# Patient Record
Sex: MALE | Race: WHITE | NOT HISPANIC OR LATINO | Employment: FULL TIME | URBAN - METROPOLITAN AREA
[De-identification: names, ages, dates, MRNs, and addresses within clinical notes are randomized per-mention and may not be internally consistent; named-entity substitution may affect disease eponyms.]

---

## 2018-08-30 ENCOUNTER — OFFICE VISIT - HEALTHEAST (OUTPATIENT)
Dept: INTERNAL MEDICINE | Facility: CLINIC | Age: 38
End: 2018-08-30

## 2018-08-30 DIAGNOSIS — N52.9 ERECTILE DYSFUNCTION, UNSPECIFIED ERECTILE DYSFUNCTION TYPE: ICD-10-CM

## 2018-08-30 DIAGNOSIS — Z13.1 SCREENING FOR DIABETES MELLITUS: ICD-10-CM

## 2018-08-30 DIAGNOSIS — Z13.220 LIPID SCREENING: ICD-10-CM

## 2018-08-30 DIAGNOSIS — Z00.00 ROUTINE MEDICAL EXAM: ICD-10-CM

## 2018-08-30 LAB
ANION GAP SERPL CALCULATED.3IONS-SCNC: 8 MMOL/L (ref 5–18)
BUN SERPL-MCNC: 15 MG/DL (ref 8–22)
CALCIUM SERPL-MCNC: 9.2 MG/DL (ref 8.5–10.5)
CHLORIDE BLD-SCNC: 109 MMOL/L (ref 98–107)
CHOLEST SERPL-MCNC: 200 MG/DL
CO2 SERPL-SCNC: 23 MMOL/L (ref 22–31)
CREAT SERPL-MCNC: 0.95 MG/DL (ref 0.7–1.3)
FASTING STATUS PATIENT QL REPORTED: YES
GFR SERPL CREATININE-BSD FRML MDRD: >60 ML/MIN/1.73M2
GLUCOSE BLD-MCNC: 105 MG/DL (ref 70–125)
HDLC SERPL-MCNC: 31 MG/DL
LDLC SERPL CALC-MCNC: 120 MG/DL
POTASSIUM BLD-SCNC: 4.1 MMOL/L (ref 3.5–5)
SODIUM SERPL-SCNC: 140 MMOL/L (ref 136–145)
TRIGL SERPL-MCNC: 245 MG/DL

## 2018-08-30 ASSESSMENT — MIFFLIN-ST. JEOR: SCORE: 2081.53

## 2018-08-31 ENCOUNTER — COMMUNICATION - HEALTHEAST (OUTPATIENT)
Dept: INTERNAL MEDICINE | Facility: CLINIC | Age: 38
End: 2018-08-31

## 2019-05-08 ENCOUNTER — OFFICE VISIT - HEALTHEAST (OUTPATIENT)
Dept: FAMILY MEDICINE | Facility: CLINIC | Age: 39
End: 2019-05-08

## 2019-05-08 DIAGNOSIS — B02.9 HERPES ZOSTER WITHOUT COMPLICATION: ICD-10-CM

## 2019-05-08 RX ORDER — DIAPER,BRIEF,INFANT-TODD,DISP
EACH MISCELLANEOUS 2 TIMES DAILY
Status: SHIPPED | COMMUNITY
Start: 2019-05-08 | End: 2022-09-13

## 2019-08-21 ENCOUNTER — COMMUNICATION - HEALTHEAST (OUTPATIENT)
Dept: SCHEDULING | Facility: CLINIC | Age: 39
End: 2019-08-21

## 2019-08-21 DIAGNOSIS — N52.9 ERECTILE DYSFUNCTION, UNSPECIFIED ERECTILE DYSFUNCTION TYPE: ICD-10-CM

## 2019-09-13 ENCOUNTER — OFFICE VISIT - HEALTHEAST (OUTPATIENT)
Dept: FAMILY MEDICINE | Facility: CLINIC | Age: 39
End: 2019-09-13

## 2019-09-13 DIAGNOSIS — N52.9 ERECTILE DYSFUNCTION, UNSPECIFIED ERECTILE DYSFUNCTION TYPE: ICD-10-CM

## 2019-09-13 DIAGNOSIS — Z00.00 ROUTINE MEDICAL EXAM: ICD-10-CM

## 2019-09-13 DIAGNOSIS — Z13.220 SCREENING, LIPID: ICD-10-CM

## 2019-09-13 DIAGNOSIS — Z13.1 SCREENING FOR DIABETES MELLITUS: ICD-10-CM

## 2019-09-13 LAB
ANION GAP SERPL CALCULATED.3IONS-SCNC: 10 MMOL/L (ref 5–18)
BUN SERPL-MCNC: 18 MG/DL (ref 8–22)
CALCIUM SERPL-MCNC: 9.6 MG/DL (ref 8.5–10.5)
CHLORIDE BLD-SCNC: 107 MMOL/L (ref 98–107)
CHOLEST SERPL-MCNC: 216 MG/DL
CO2 SERPL-SCNC: 23 MMOL/L (ref 22–31)
CREAT SERPL-MCNC: 0.99 MG/DL (ref 0.7–1.3)
FASTING STATUS PATIENT QL REPORTED: YES
GFR SERPL CREATININE-BSD FRML MDRD: >60 ML/MIN/1.73M2
GLUCOSE BLD-MCNC: 105 MG/DL (ref 70–125)
HDLC SERPL-MCNC: 36 MG/DL
LDLC SERPL CALC-MCNC: 143 MG/DL
POTASSIUM BLD-SCNC: 4.3 MMOL/L (ref 3.5–5)
SODIUM SERPL-SCNC: 140 MMOL/L (ref 136–145)
TRIGL SERPL-MCNC: 183 MG/DL

## 2019-09-13 RX ORDER — SILDENAFIL CITRATE 20 MG/1
TABLET ORAL
Qty: 90 TABLET | Refills: 1 | Status: SHIPPED | OUTPATIENT
Start: 2019-09-13 | End: 2022-08-11

## 2019-09-13 ASSESSMENT — MIFFLIN-ST. JEOR: SCORE: 2102.96

## 2019-09-16 ENCOUNTER — COMMUNICATION - HEALTHEAST (OUTPATIENT)
Dept: FAMILY MEDICINE | Facility: CLINIC | Age: 39
End: 2019-09-16

## 2021-05-28 NOTE — PROGRESS NOTES
Assessment/Plan:         Abimael was seen today for rash.    Diagnoses and all orders for this visit:    Herpes zoster without complication: Classic appearance of shingles.  He is developing active new lesions so we will go ahead and treat with Valtrex.  Also given information about self cares, recommend ibuprofen and over-the-counter lidocaine cream.  Discussed concerning symptoms for which she should return.  Discussed transmission mechanisms particularly for immunocompromised-he has no significant contact with anyone at risk.  -     valACYclovir (VALTREX) 1000 MG tablet; Take 1 tablet (1,000 mg total) by mouth 3 (three) times a day for 7 days.            Plan of care was discussed with the patient and/or guardian. They verbalize understanding of the treatment options and plan of care.    Lilliana Ang       Subjective:        Abimael Lugo is a 39 y.o. male who presents for rash on his sie and chest.  Initially saw some lesions on his left chest 4 days ago. He had been out cutting down trees the days before so he thought maybe they were poison ivy or poison oak. Not painful or itchy so he ignored them. Over the last few days, more sores have appeared and they are more red. It is now starting to have a burning sensation and even the touch of his shirt hurts. It has spread around to the front of his chest in several small patches.     He has never had this before. Did have chickenpox as a child. He is fully immunized. Nobody at home has sores.    He otherwise feels well.  No fever, chills, rhinorrhea, cough.         Objective:       Blood pressure (!) 142/100, pulse 60, temperature 98.5  F (36.9  C), temperature source Oral, resp. rate 16, weight (!) 267 lb 1 oz (121.1 kg), SpO2 97 %.   Gen: Well-appearing, no acute distress.  Skin: Clear, fluid-filled papules on erythematous base in a dermatomal distribution on the left side of his chest around the level of T5.  Close to his back, the lesions are crusted.   As it wraps around to the front of his chest, there are newer looking fluid-filled papules with no crusting.

## 2021-05-31 NOTE — TELEPHONE ENCOUNTER
Refill Request  Did you contact pharmacy: No  Medication name:   Requested Prescriptions     Pending Prescriptions Disp Refills     sildenafil (REVATIO) 20 mg tablet 30 tablet 1     Sig: Take 2-4 tablets as directed, as needed     Who prescribed the medication: Dr. Enrike Bautista  Pharmacy Name and Location: [place], [place] ([facility] )  Is patient out of medication: Yes  Patient notified refills processed in 72 hours:  yes  Okay to leave a detailed message: yes  [phone]

## 2021-05-31 NOTE — TELEPHONE ENCOUNTER
RN cannot approve Refill Request    Former patient of [facility] has not established care with another provider.  Please assign refill request to covering provider per Clinic standard process.    RN can NOT refill this medication PCP messaged that patient is overdue for Office Visit. Last office visit: Visit date not found Last Physical: Visit date not found Last MTM visit: Visit date not found Last visit same specialty: Visit date not found.  Next visit within 3 mo: Visit date not found  Next physical within 3 mo: Visit date not found      Chasidy Salmeron, [facility] Triage/Med Refill 8/21/2019    Requested Prescriptions   Pending Prescriptions Disp Refills     sildenafil (REVATIO) 20 mg tablet 30 tablet 1     Sig: Take 2-4 tablets as directed, as needed       Medications for Impotence Refill Protocol Failed - 8/21/2019  9:09 AM        Failed - PCP or prescribing provider visit in last year     Last office visit with prescriber/PCP: Visit date not found OR same dept: Visit date not found OR same specialty: Visit date not found  Last physical: Visit date not found Last MTM visit: Visit date not found   Next visit within 3 mo: Visit date not found  Next physical within 3 mo: Visit date not found  Prescriber OR PCP: No Primary Care Provider  Last diagnosis associated with med order: There are no diagnoses linked to this encounter.  If protocol passes may refill for 12 months if within 3 months of last provider visit (or a total of 15 months).

## 2021-05-31 NOTE — TELEPHONE ENCOUNTER
I see no record the patient is established with the internal medicine clinic.  He has seen Dr. Enrike Bautista, and can follow-up with Dr. Enrike Bautista.  Refill request could be sent to Dr. Enrike Bautista.

## 2021-05-31 NOTE — TELEPHONE ENCOUNTER
Left message for pt. To call clinic please give message below:    Pt needs to set up an established care visit with one of our providers for continued care medication's.

## 2021-06-02 VITALS — WEIGHT: 258 LBS | HEIGHT: 70 IN | BODY MASS INDEX: 36.94 KG/M2

## 2021-06-03 VITALS
HEIGHT: 70 IN | SYSTOLIC BLOOD PRESSURE: 126 MMHG | HEART RATE: 62 BPM | WEIGHT: 263.6 LBS | BODY MASS INDEX: 37.74 KG/M2 | DIASTOLIC BLOOD PRESSURE: 84 MMHG | OXYGEN SATURATION: 98 %

## 2021-06-03 VITALS — BODY MASS INDEX: 38.32 KG/M2 | WEIGHT: 267.06 LBS

## 2021-06-16 PROBLEM — N52.9 ERECTILE DYSFUNCTION, UNSPECIFIED ERECTILE DYSFUNCTION TYPE: Status: ACTIVE | Noted: 2018-08-30

## 2021-06-17 NOTE — PATIENT INSTRUCTIONS - HE
Patient Instructions by Lilliana Ang MD at 5/8/2019  3:20 PM     Author: Lilliana Ang MD Service: -- Author Type: Physician    Filed: 5/8/2019  3:45 PM Encounter Date: 5/8/2019 Status: Signed    : Lilliana Ang MD (Physician)         Patient Education     LIDOCAINE (Over-the counter pain cream).   Shingles  Shingles is a viral infection caused by the same virus as chicken pox. Anyone who has had chicken pox may get shingles later in life. The virus stays in the body, but remains dormant (asleep). Shingles often occurs in older persons or persons with lowered immunity. But it can affect anyone at any age.  Shingles starts as a tingling patch of skin on one side of the body. Small, painful blisters may then appear. The rash does not spread to the rest of the body.  Exposure to shingles cannot cause shingles. However, it can cause chicken pox in anyone who has not had chicken pox or has not been vaccinated. The contagious period ends when all blisters have crusted over (generally about 2 weeks after the illness begins).  After the blisters heal, the affected skin may be sensitive or painful for months (neuralgia). This often gradually goes away.  A shingles vaccine is available. This can help prevent shingles or make it less painful. It is generally recommended for adults over the age of 60 who have had chicken pox in the past, but who have never had shingles. Adults over 60 who have had neither chicken pox nor shingles can prevent both diseases with the chicken pox vaccine. Ask your healthcare provider about these vaccines.  Home care    Medicines may be prescribed to help relieve pain. Take these medicines as directed. Ask your healthcare provider or pharmacist before using over-the-counter medicines for helping treat pain and itching.    In certain cases, antiviral medicines may be prescribed to reduce pain, shorten the illness, and prevent neuralgia. Take these medicines as  directed.    Compresses made from a solution of cool water mixed with cornstarch or baking soda may help relieve pain and itching.     Gently wash skin daily with soap and water to help prevent infection.  Be certain to rinse off all of the soap, which can be irritating.    Trim fingernails and try not to scratch. Scratching the sores may leave scars.    Stay home from work or school until all blisters have formed a crust and you are no longer contagious.  Follow-up care  Follow up with your healthcare provider or as directed by our staff.  When to seek medical advice    Fever of 100.4 F (38 C) or higher, or as directed by your healthcare provider    Affected skin is on the face or neck and any of the following occur:  ? Headache  ? Eye pain  ? Changes in vision  ? Sores near the eye  ? Weakness of facial muscles    Pain, redness, or swelling of a joint    Signs of skin infection: colored drainage from the sores, warmth, increasing redness, or increasing pain  Date Last Reviewed: 9/25/2015 2000-2017 [employer]. [address] [place] [address], [place] [address]. All rights reserved. This information is not intended as a substitute for professional medical care. Always follow your healthcare professional's instructions.

## 2021-06-19 NOTE — LETTER
Letter by Enrike Bautista MD at      Author: Enrike Bautista MD Service: -- Author Type: --    Filed:  Encounter Date: 9/16/2019 Status: (Other)         Abimael Lugo  [address] [facility] [address]   September 16, 2019     Dear Mr. Lugo,    Below are the results from your recent visit:  Resulted Orders   Basic Metabolic Panel   Result Value Ref Range    Sodium 140 136 - 145 mmol/L    Potassium 4.3 3.5 - 5.0 mmol/L    Chloride 107 98 - 107 mmol/L    CO2 23 22 - 31 mmol/L    Anion Gap, Calculation 10 5 - 18 mmol/L    Glucose 105 70 - 125 mg/dL    Calcium 9.6 8.5 - 10.5 mg/dL    BUN 18 8 - 22 mg/dL    Creatinine 0.99 0.70 - 1.30 mg/dL    GFR MDRD Af Amer >60 >60 mL/min/1.73m2    GFR MDRD Non Af Amer >60 >60 mL/min/1.73m2    Narrative    Fasting Glucose reference range is 70-99 mg/dL per  American Diabetes Association (ADA) guidelines.   Lipid Profile   Result Value Ref Range    Triglycerides 183 (H) <=149 mg/dL    Cholesterol 216 (H) <=199 mg/dL    LDL Calculated 143 (H) <=129 mg/dL    HDL Cholesterol 36 (L) >=40 mg/dL    Patient Fasting > 8hrs? Yes    Your kidney function tests are normal, and your blood sugar level is stable from last year, still what I would call low borderline.   Cholesterol is a bit higher than last time, with the LDL high at 143.     For both of these, I'd just work on your diet and exercise, and let's recheck in a year.     Please call with questions or contact us using [employer].    Sincerely,    Electronically signed by Enrike Bautista MD

## 2021-06-20 NOTE — PROGRESS NOTES
MALE PREVENTATIVE EXAM    Assessment and Plan:       1. Routine medical exam    We discussed healthy lifestyles and adequate diet today and also discussed increasing his exercise and losing weight.  The patient knows that he does need to lose weight more healthy.  I also emphasized this, given the fact that he has a pretty strong family history of hypertension, and he will need to work on his exercise long-term and his diet to keep that blood pressure down the best he can.  I suggested that he continue with trying to lose some weight on his own and recheck in about 6 months to see how things are coming along.    2. Erectile dysfunction, unspecified erectile dysfunction type    We discussed the fact that if he does lose some weight and exercises more, the erectile dysfunction might improve on its own, but he was given a prescription for some sildenafil that he can use as directed for some erectile dysfunction in the meantime he will let me know how it goes.    3. Lipid screening    - Lipid Profile    4. Screening for diabetes mellitus    - Basic Metabolic Panel        Next follow up:  No Follow-up on file.    Immunization Review  Adult Imm Review: No immunizations due today      I discussed the following with the patient:   Adult Healthy Living: Importance of regular exercise  Healthy nutrition  Weight loss referral options  Getting adequate sleep  Stress management  Use of seat belts      Subjective:   Chief Complaint: Abimael Lugo is an 38 y.o. male here for a preventative health visit.     HPI: Patient is here for a full physical today.  He states that he really has not been to a doctor for about 15 years.  He generally has been in good health.  He has had some minor sports medicine related issues over the years.  He played hockey in high school, and he has had a knee procedure done and he had a wrist fracture as well when he was in high school.    He admits that he is overweight, and is trying to lose weight  "now on his own.  He realized about a month ago that he probably needed to really buckled down and do this.  This was been trying to decrease his portion size and increase his exercise commitment.    He also discusses some issues ongoing now erectile dysfunction.  He has noticed this more over the last couple of months.  He states that he can often get an erection good enough for initial penetration but cannot go to completion of intercourse.  He has no problem with masturbation.  He has not had this issue in the past.  He has good libido and thinks it is just a bit of a performance anxiety issue at this point.    Healthy Habits  Are you taking a daily aspirin? No  Do you typically exercising at least 40 min, 3-4 times per week?  NO  Do you usually eat at least 4 servings of fruit and vegetables a day, include whole grains and fiber and avoid regularly eating high fat foods? NO  Have you had an eye exam in the past two years? NO  Do you see a dentist twice per year? Yes  Do you have any concerns regarding sleep? No    Safety Screen  If you own firearms, are they secured in a locked gun cabinet or with trigger locks? The patient declines to answer  Do you feel you are safe where you are living?: Yes (8/30/2018  7:24 AM)  Do you feel you are safe in your relationship(s)?: Yes (8/30/2018  7:24 AM)    Review of Systems:  Please see above.  The rest of the review of systems are negative for all systems.     Cancer Screening     Patient has no health maintenance due at this time            History     Reviewed By Date/Time Sections Reviewed    Enrike Bautista MD 8/30/2018  7:36 AM Medical, Surgical, Tobacco, Alcohol, Drug Use, Sexual Activity, Family    Elizabeth Lugo CMA 8/30/2018  7:24 AM Tobacco, Family    Elizabeth Lugo CMA 8/30/2018  7:23 AM Family            Objective:   Vital Signs:   Visit Vitals     /88     Pulse (!) 56     Ht 5' 10\" (1.778 m)     Wt (!) 258 lb (117 kg)     BMI 37.02 kg/m2    "       PHYSICAL EXAM  Well developed, well nourished, no acute distress.  HEENT: normocephalic/atraumatic, PERRLA/EOMI, TMs: Gray, normal light reflex, no nasal discharge.  Oral mucosa: no erythema/exudate  Neck: No LAD/masses/thyromegaly/bruits  Lungs: clear bilaterally  Heart: regular rate and rhythm, no murmurs/gallops/rubs.  Abdomen: Normal bowel sounds, soft, non-tender, non-distended, no masses, neg Solorzano's/McBurney's, no rebound/guarding  Genital: Bilaterally descended testes, no masses, non-tender, no hernia.  No urethral discharge or erythema.  No lesions, normal phallus.  Lymphatics: no supraclavicular/axillary/epitrochlear/inguinal LAD. No edema.  Neuro: A&O x 3, CN II-XII intact, strength 5/5, reflexes symmetric, sensory intact to light touch.  Psych: Behavior appropriate, engaging.  Thought processes congruent, non-tangential.  Musculoskeletal: no gross deformities.  Skin: no rashes or lesions.            Medication List          These changes are accurate as of 8/30/18  8:20 AM.  If you have any questions, ask your nurse or doctor.               START taking these medications          sildenafil (antihypertensive) 20 mg tablet   Also known as:  REVATIO   INSTRUCTIONS:  Take 2-4 tablets as directed, as needed   Started by:  Enrike Bautista MD                Where to Get Your Medications      You can get these medications from any pharmacy     Bring a paper prescription for each of these medications      sildenafil (antihypertensive) 20 mg tablet             Additional Screenings Completed Today:

## 2021-06-28 NOTE — PROGRESS NOTES
Progress Notes by Enrike Bautista MD at 9/13/2019  7:40 AM     Author: Enrike Bautista MD Service: -- Author Type: Physician    Filed: 9/13/2019  3:27 PM Encounter Date: 9/13/2019 Status: Signed    : Enrike Bautista MD (Physician)       MALE PREVENTATIVE EXAM    Assessment and Plan:       1. Routine medical exam    Generally the patient is doing very well.  We discussed healthy lifestyles, including adequate exercise (3-4 times a week for 20-30 minutes), and a healthy diet.  Patient should return for annual physicals, and we can also see them here as needed.       2. Erectile dysfunction, unspecified erectile dysfunction type    This would quite well for him, so would like to have a refill and I did print one out for him that he can take to the pharmacy.  If he needs another good Rx card he will let us know.    - sildenafil (REVATIO) 20 mg tablet; Take 2-4 tablets as directed, as needed  Dispense: 90 tablet; Refill: 1    3. Screening for diabetes mellitus    - Basic Metabolic Panel    4. Screening, lipid    - Lipid Profile        Next follow up:  No follow-ups on file.    Immunization Review  Adult Imm Review: No immunizations due today        I discussed the following with the patient:   Adult Healthy Living: Importance of regular exercise  Healthy nutrition  Weight loss referral options  Getting adequate sleep  Stress management    I have had an Advance Directives discussion with the patient.    Subjective:   Chief Complaint: Abimael Lugo is an 39 y.o. male here for a preventative health visit.     HPI: He is here for a physical today he is generally doing very well.  He needs some blood blood work done, and he is fasting so we will do that for him today.  His borderline glucose was reviewed from last year so we will do that again as well as his cholesterol.    He does have the erectile dysfunction we talked about last year and we gave him a prescription for some sildenafil which is worked well for him he  "would like to have a refill done for that today.    Otherwise he is doing quite well without any other issues or questions today.    Healthy Habits  Are you taking a daily aspirin? No  Do you typically exercising at least 40 min, 3-4 times per week?  Yes  Do you usually eat at least 4 servings of fruit and vegetables a day, include whole grains and fiber and avoid regularly eating high fat foods? Yes  Have you had an eye exam in the past two years? NO  Do you see a dentist twice per year? NO  Do you have any concerns regarding sleep? No    Safety Screen  If you own firearms, are they secured in a locked gun cabinet or with trigger locks? Yes  Do you feel you are safe where you are living?: Yes (5/8/2019  3:21 PM)  Do you feel you are safe in your relationship(s)?: Yes (5/8/2019  3:21 PM)      Review of Systems:  Please see above.  The rest of the review of systems are negative for all systems.     Cancer Screening     Patient has no health maintenance due at this time          Patient Care Team:  Enrike Bautista MD as PCP - General (Family Medicine)  Enrike Bautista MD as Assigned PCP        History     Reviewed By Date/Time Sections Reviewed    Vanesa Cantu CMA 9/13/2019  8:23 AM Tobacco            Objective:   Vital Signs:   Visit Vitals  /84 (Patient Site: Left Arm, Patient Position: Sitting, Cuff Size: Adult Large)   Pulse 62   Ht 5' 9.75\" (1.772 m)   Wt (!) 263 lb 9.6 oz (119.6 kg)   SpO2 98%   BMI 38.09 kg/m           PHYSICAL EXAM    Well developed, well nourished, no acute distress.  HEENT: normocephalic/atraumatic, PERRLA/EOMI, TMs: Gray, normal light reflex, no nasal discharge.  Oral mucosa: no erythema/exudate  Neck: No LAD/masses/thyromegaly/bruits  Lungs: clear bilaterally  Heart: regular rate and rhythm, no murmurs/gallops/rubs.  Abdomen: Normal bowel sounds, soft, non-tender, non-distended, no masses, neg Solorzano's/McBurney's, no rebound/guarding  Genital: Bilaterally descended testes, no " masses, non-tender, no hernia.  No urethral discharge or erythema.  No lesions, normal phallus.  Lymphatics: no supraclavicular/axillary/epitrochlear/inguinal LAD. No edema.  Neuro: A&O x 3, CN II-XII intact, strength 5/5, reflexes symmetric, sensory intact to light touch.  Psych: Behavior appropriate, engaging.  Thought processes congruent, non-tangential.  Musculoskeletal: no gross deformities.  Skin: no rashes or lesions.            Medication List           Accurate as of 9/13/19  3:27 PM. If you have any questions, ask your nurse or doctor.               CONTINUE taking these medications    hydrocortisone 0.5 % cream  INSTRUCTIONS:  Apply topically 2 (two) times a day.        sildenafil 20 mg tablet  Also known as:  REVATIO  INSTRUCTIONS:  Take 2-4 tablets as directed, as needed           STOP taking these medications    calamine lotion  Stopped by:  Enrike Bautista MD           Where to Get Your Medications      You can get these medications from any pharmacy    Bring a paper prescription for each of these medications    sildenafil 20 mg tablet         Additional Screenings Completed Today:

## 2021-07-03 NOTE — ADDENDUM NOTE
Addendum Note by Samara Gaston LPN at 8/22/2019  2:10 PM     Author: Samara Gaston LPN Service: -- Author Type: Licensed Nurse    Filed: 8/22/2019  2:10 PM Encounter Date: 8/21/2019 Status: Signed    : Samara Gaston LPN (Licensed Nurse)    Addended by: SAMARA GASTON on: 8/22/2019 02:10 PM        Modules accepted: Orders

## 2022-04-20 NOTE — TELEPHONE ENCOUNTER
Patient Returning Call  Reason for call:  Returning call  Information relayed to patient:    Relayed information to patient.  Patient has additional questions:  No  If YES, what are your questions/concerns:    No additional questions at this time.  Okay to leave a detailed message?: No call back needed   Skin Substitute Text: The defect edges were debeveled with a #15 scalpel blade.  Given the location of the defect, shape of the defect and the proximity to free margins a skin substitute graft was deemed most appropriate.  The graft material was trimmed to fit the size of the defect. The graft was then placed in the primary defect and oriented appropriately.

## 2022-08-11 ENCOUNTER — OFFICE VISIT (OUTPATIENT)
Dept: FAMILY MEDICINE | Facility: CLINIC | Age: 42
End: 2022-08-11
Payer: COMMERCIAL

## 2022-08-11 VITALS
HEIGHT: 71 IN | BODY MASS INDEX: 39.2 KG/M2 | DIASTOLIC BLOOD PRESSURE: 110 MMHG | SYSTOLIC BLOOD PRESSURE: 160 MMHG | WEIGHT: 280 LBS | OXYGEN SATURATION: 98 % | HEART RATE: 78 BPM

## 2022-08-11 DIAGNOSIS — R73.03 PREDIABETES: ICD-10-CM

## 2022-08-11 DIAGNOSIS — E66.01 CLASS 2 SEVERE OBESITY WITH SERIOUS COMORBIDITY AND BODY MASS INDEX (BMI) OF 39.0 TO 39.9 IN ADULT, UNSPECIFIED OBESITY TYPE (H): ICD-10-CM

## 2022-08-11 DIAGNOSIS — Z00.00 ROUTINE ADULT HEALTH MAINTENANCE: Primary | ICD-10-CM

## 2022-08-11 DIAGNOSIS — I10 PRIMARY HYPERTENSION: ICD-10-CM

## 2022-08-11 DIAGNOSIS — N52.9 ERECTILE DYSFUNCTION, UNSPECIFIED ERECTILE DYSFUNCTION TYPE: ICD-10-CM

## 2022-08-11 DIAGNOSIS — E66.812 CLASS 2 SEVERE OBESITY WITH SERIOUS COMORBIDITY AND BODY MASS INDEX (BMI) OF 39.0 TO 39.9 IN ADULT, UNSPECIFIED OBESITY TYPE (H): ICD-10-CM

## 2022-08-11 LAB
ALBUMIN SERPL BCG-MCNC: 4.7 G/DL (ref 3.5–5.2)
ALP SERPL-CCNC: 52 U/L (ref 40–129)
ALT SERPL W P-5'-P-CCNC: 70 U/L (ref 10–50)
ANION GAP SERPL CALCULATED.3IONS-SCNC: 16 MMOL/L (ref 7–15)
AST SERPL W P-5'-P-CCNC: 53 U/L (ref 10–50)
BILIRUB SERPL-MCNC: 0.8 MG/DL
BUN SERPL-MCNC: 17.1 MG/DL (ref 6–20)
CALCIUM SERPL-MCNC: 9.3 MG/DL (ref 8.6–10)
CHLORIDE SERPL-SCNC: 104 MMOL/L (ref 98–107)
CHOLEST SERPL-MCNC: 207 MG/DL
CREAT SERPL-MCNC: 1.01 MG/DL (ref 0.67–1.17)
DEPRECATED HCO3 PLAS-SCNC: 22 MMOL/L (ref 22–29)
GFR SERPL CREATININE-BSD FRML MDRD: >90 ML/MIN/1.73M2
GLUCOSE SERPL-MCNC: 110 MG/DL (ref 70–99)
HBA1C MFR BLD: 5.1 % (ref 0–5.6)
HDLC SERPL-MCNC: 34 MG/DL
LDLC SERPL CALC-MCNC: ABNORMAL MG/DL
NONHDLC SERPL-MCNC: 173 MG/DL
POTASSIUM SERPL-SCNC: 4.1 MMOL/L (ref 3.4–5.3)
PROT SERPL-MCNC: 7 G/DL (ref 6.4–8.3)
PSA SERPL-MCNC: 0.44 NG/ML (ref 0–2.5)
SODIUM SERPL-SCNC: 142 MMOL/L (ref 136–145)
TRIGL SERPL-MCNC: 405 MG/DL

## 2022-08-11 PROCEDURE — 36415 COLL VENOUS BLD VENIPUNCTURE: CPT | Performed by: FAMILY MEDICINE

## 2022-08-11 PROCEDURE — 90715 TDAP VACCINE 7 YRS/> IM: CPT | Performed by: FAMILY MEDICINE

## 2022-08-11 PROCEDURE — 90471 IMMUNIZATION ADMIN: CPT | Performed by: FAMILY MEDICINE

## 2022-08-11 PROCEDURE — G0103 PSA SCREENING: HCPCS | Performed by: FAMILY MEDICINE

## 2022-08-11 PROCEDURE — 80053 COMPREHEN METABOLIC PANEL: CPT | Performed by: FAMILY MEDICINE

## 2022-08-11 PROCEDURE — 84270 ASSAY OF SEX HORMONE GLOBUL: CPT | Performed by: FAMILY MEDICINE

## 2022-08-11 PROCEDURE — 83036 HEMOGLOBIN GLYCOSYLATED A1C: CPT | Performed by: FAMILY MEDICINE

## 2022-08-11 PROCEDURE — 99214 OFFICE O/P EST MOD 30 MIN: CPT | Mod: 25 | Performed by: FAMILY MEDICINE

## 2022-08-11 PROCEDURE — 84403 ASSAY OF TOTAL TESTOSTERONE: CPT | Performed by: FAMILY MEDICINE

## 2022-08-11 PROCEDURE — 99396 PREV VISIT EST AGE 40-64: CPT | Performed by: FAMILY MEDICINE

## 2022-08-11 PROCEDURE — 80061 LIPID PANEL: CPT | Performed by: FAMILY MEDICINE

## 2022-08-11 RX ORDER — LISINOPRIL 10 MG/1
10 TABLET ORAL DAILY
Qty: 30 TABLET | Refills: 0 | Status: SHIPPED | OUTPATIENT
Start: 2022-08-11 | End: 2022-09-08

## 2022-08-11 RX ORDER — SILDENAFIL CITRATE 20 MG/1
TABLET ORAL
Qty: 90 TABLET | Refills: 3 | Status: SHIPPED | OUTPATIENT
Start: 2022-08-11 | End: 2022-09-13

## 2022-08-11 ASSESSMENT — ENCOUNTER SYMPTOMS
JOINT SWELLING: 0
DIARRHEA: 0
WEAKNESS: 0
DIZZINESS: 0
NERVOUS/ANXIOUS: 0
ARTHRALGIAS: 0
SHORTNESS OF BREATH: 0
ABDOMINAL PAIN: 0
COUGH: 0
HEMATURIA: 0
FREQUENCY: 0
CHILLS: 0
DYSURIA: 0
EYE PAIN: 0
HEARTBURN: 0
PALPITATIONS: 0
HEMATOCHEZIA: 0
CONSTIPATION: 0
MYALGIAS: 0
FEVER: 0
SORE THROAT: 0
HEADACHES: 0
PARESTHESIAS: 0
NAUSEA: 0

## 2022-08-11 NOTE — PROGRESS NOTES
SUBJECTIVE:   CC: Abimael Lugo is an 42 year old male who presents for preventative health visit.       Patient has been advised of split billing requirements and indicates understanding: Yes  Healthy Habits:     Getting at least 3 servings of Calcium per day:  Yes    Bi-annual eye exam:  NO    Dental care twice a year:  Yes    Sleep apnea or symptoms of sleep apnea:  Excessive snoring    Diet:  Regular (no restrictions)    Frequency of exercise:  4-5 days/week    Duration of exercise:  30-45 minutes    Taking medications regularly:  Yes    Medication side effects:  Not applicable    PHQ-2 Total Score: 0    Additional concerns today:  No    Ability to successfully perform activities of daily living: Yes, no assistance needed  Home safety:  none identified     Today's PHQ-2 Score:   PHQ-2 ( 1999 [employer]) 8/11/2022   Q1: Little interest or pleasure in doing things 0   Q2: Feeling down, depressed or hopeless 0   PHQ-2 Score 0   Q1: Little interest or pleasure in doing things Not at all   Q2: Feeling down, depressed or hopeless Not at all   PHQ-2 Score 0       Abuse: Current or Past(Physical, Sexual or Emotional)- No  Do you feel safe in your environment? Yes        Social History     Tobacco Use     Smoking status: Never Smoker     Smokeless tobacco: Never Used   Substance Use Topics     Alcohol use: Yes     Alcohol/week: 9.0 - 10.0 standard drinks         Alcohol Use 8/11/2022   Prescreen: >3 drinks/day or >7 drinks/week? Yes   AUDIT SCORE  6       Last PSA: No results found for: PSA    Reviewed orders with patient. Reviewed health maintenance and updated orders accordingly - Yes  Lab work is in process    Reviewed and updated as needed this visit by clinical staff   Tobacco  Allergies  Meds  Problems               Reviewed and updated as needed this visit by Provider     Meds  Problems              No past medical history on file.   Past Surgical History:   Procedure Laterality Date     FRACTURE SURGERY    "   wrist fracture     HC KNEE SCOPE,MED/LAT MENISCUS REPAIR      Description: Knee Arthroscopy With Lateral Meniscus Repair;  Recorded: 01/11/2012;       Review of Systems   Constitutional: Negative for chills and fever.   HENT: Negative for congestion, ear pain, hearing loss and sore throat.    Eyes: Negative for pain and visual disturbance.   Respiratory: Negative for cough and shortness of breath.    Cardiovascular: Negative for chest pain, palpitations and peripheral edema.   Gastrointestinal: Negative for abdominal pain, constipation, diarrhea, heartburn, hematochezia and nausea.   Genitourinary: Positive for impotence. Negative for dysuria, frequency, genital sores, hematuria, penile discharge and urgency.   Musculoskeletal: Negative for arthralgias, joint swelling and myalgias.   Skin: Negative for rash.   Neurological: Negative for dizziness, weakness, headaches and paresthesias.   Psychiatric/Behavioral: Negative for mood changes. The patient is not nervous/anxious.        OBJECTIVE:   BP (!) 160/110   Pulse 78   Ht 1.791 m (5' 10.5\")   Wt 127 kg (280 lb)   SpO2 98%   BMI 39.61 kg/m      Physical Exam  GENERAL: healthy, alert and no distress  EYES: Eyes grossly normal to inspection, PERRL and conjunctivae and sclerae normal  HENT: ear canals and TM's normal  NECK: no adenopathy, no asymmetry, masses, or scars and thyroid normal to palpation  RESP: lungs clear to auscultation - no rales, rhonchi or wheezes  CV: regular rate and rhythm, normal S1 S2, no S3 or S4, no murmur, click or rub, no peripheral edema and peripheral pulses strong  ABDOMEN: soft, nontender, no hepatosplenomegaly, no masses and bowel sounds normal  MS: no gross musculoskeletal defects noted, no edema  SKIN: no suspicious lesions or rashes  NEURO: Normal strength and tone, mentation intact and speech normal  PSYCH: mentation appears normal, affect normal/bright    Diagnostic Test Results:  Labs reviewed in Epic    ASSESSMENT/PLAN: " "  Abimael was seen today for physical.    Diagnoses and all orders for this visit:    Routine adult health maintenance  -     Lipid Profile (Chol, Trig, HDL, LDL calc); Future  -     Comprehensive metabolic panel (BMP + Alb, Alk Phos, ALT, AST, Total. Bili, TP); Future  -     PSA, screen; Future  -     TDAP VACCINE (Adacel, Boostrix)  [[id number]]  We discussed healthy lifestyle, nutrition, cardiovascular risk reduction, self care, safety, sunscreen, and timing of cancer screening.  Health maintenance screening and immunizations reviewed with the patient.  Follow up yearly for the annual physical.     Primary hypertension  New diagnosis.  Discussed complications of uncontrolled hypertension  Start lisinopril  Counseled lifestyle modifications  Follow-up in 1 month  -     Comprehensive metabolic panel (BMP + Alb, Alk Phos, ALT, AST, Total. Bili, TP); Future    Prediabetes  -     Hemoglobin A1c; Future  Counseled healthy lifestyle modifications    Erectile dysfunction, unspecified erectile dysfunction type  -     sildenafil (REVATIO) 20 MG tablet; [SILDENAFIL (REVATIO) 20 MG TABLET] Take 2-4 tablets as directed, as needed  -     Testosterone Free and Total; Future  refilled    Class 2 severe obesity with serious comorbidity and body mass index (BMI) of 39.0 to 39.9 in adult, unspecified obesity type (H)  Counseled healthy lifestyle modifications      COUNSELING:   Reviewed preventive health counseling, as reflected in patient instructions    Estimated body mass index is 39.61 kg/m  as calculated from the following:    Height as of this encounter: 1.791 m (5' 10.5\").    Weight as of this encounter: 127 kg (280 lb).     Weight management plan: Discussed healthy diet and exercise guidelines    He reports that he has never smoked. He has never used smokeless tobacco.      Counseling Resources:  ATP IV Guidelines  Pooled Cohorts Equation Calculator  FRAX Risk Assessment  ICSI Preventive Guidelines  Dietary Guidelines for " Americans, 2010  USDA's MyPlate  ASA Prophylaxis  Lung CA Screening    Familia Mariano MD  [facility]

## 2022-08-12 LAB — SHBG SERPL-SCNC: 9 NMOL/L (ref 11–80)

## 2022-08-15 LAB
TESTOST FREE SERPL-MCNC: 7.01 NG/DL
TESTOST SERPL-MCNC: 215 NG/DL (ref 240–950)

## 2022-09-07 DIAGNOSIS — I10 PRIMARY HYPERTENSION: ICD-10-CM

## 2022-09-07 NOTE — TELEPHONE ENCOUNTER
"Routing refill request to provider for review/approval because:  bp out of range    Last Written Prescription Date:  8/11/22  Last Fill Quantity: 30,  # refills: 0   Last office visit provider:  8/11/22     Requested Prescriptions   Pending Prescriptions Disp Refills     lisinopril (ZESTRIL) 10 MG tablet [Pharmacy Med Name: LISINOPRIL 10 MG TABLET] 30 tablet 0     Sig: TAKE 1 TABLET (10 MG) BY MOUTH DAILY.       ACE Inhibitors (Including Combos) Protocol Failed - 9/7/2022  9:45 AM        Failed - Blood pressure under 140/90 in past 12 months     BP Readings from Last 3 Encounters:   08/11/22 (!) 160/110   09/13/19 126/84                 Passed - Recent (12 mo) or future (30 days) visit within the authorizing provider's specialty     Patient has had an office visit with the authorizing provider or a provider within the authorizing providers department within the previous 12 mos or has a future within next 30 days. See \"Patient Info\" tab in inbasket, or \"Choose Columns\" in Meds & Orders section of the refill encounter.              Passed - Medication is active on med list        Passed - Patient is age 18 or older        Passed - Normal serum creatinine on file in past 12 months     Recent Labs   Lab Test 08/11/22  0805   CR 1.01       Ok to refill medication if creatinine is low          Passed - Normal serum potassium on file in past 12 months     Recent Labs   Lab Test 08/11/22  0805   POTASSIUM 4.1                  Lilliana Guerra RN 09/07/22 4:58 PM  "

## 2022-09-08 RX ORDER — LISINOPRIL 10 MG/1
10 TABLET ORAL DAILY
Qty: 30 TABLET | Refills: 0 | Status: SHIPPED | OUTPATIENT
Start: 2022-09-08 | End: 2022-09-13

## 2022-09-13 ENCOUNTER — OFFICE VISIT (OUTPATIENT)
Dept: FAMILY MEDICINE | Facility: CLINIC | Age: 42
End: 2022-09-13
Payer: COMMERCIAL

## 2022-09-13 VITALS
HEIGHT: 71 IN | DIASTOLIC BLOOD PRESSURE: 90 MMHG | OXYGEN SATURATION: 99 % | HEART RATE: 65 BPM | WEIGHT: 268.25 LBS | SYSTOLIC BLOOD PRESSURE: 122 MMHG | BODY MASS INDEX: 37.56 KG/M2

## 2022-09-13 DIAGNOSIS — I10 PRIMARY HYPERTENSION: Primary | ICD-10-CM

## 2022-09-13 DIAGNOSIS — N52.9 ERECTILE DYSFUNCTION, UNSPECIFIED ERECTILE DYSFUNCTION TYPE: ICD-10-CM

## 2022-09-13 PROCEDURE — 99214 OFFICE O/P EST MOD 30 MIN: CPT | Performed by: FAMILY MEDICINE

## 2022-09-13 RX ORDER — LISINOPRIL 20 MG/1
20 TABLET ORAL DAILY
Qty: 90 TABLET | Refills: 0 | Status: SHIPPED | OUTPATIENT
Start: 2022-09-13 | End: 2022-12-18

## 2022-09-13 NOTE — PROGRESS NOTES
Assessment & Plan     Primary hypertension  Increase lisinopril to 20 mg.  Continue with healthy lifestyle modification.  Motivational interviewing was utilized today.  Follow-up in 3 months for blood pressure recheck  - lisinopril (ZESTRIL) 20 MG tablet  Dispense: 90 tablet; Refill: 0    Erectile dysfunction, unspecified erectile dysfunction type  We had a long discussion regarding testosterone levels and how it may or may not correlate with symptoms.  Total testosterone 215 does not meet criteria for hypergonadism.  We did talk about off label use of testosterone to help with his symptoms especially that of sexual health, physical health, and metabolic syndrome.  We spoke about testosterone benefit and side effects.  He will think about it and contact me if he is interested in starting testosterone to help with symptomatology      Familia Mariano MD  [facility]    Suzanna Varghese is a 42 year old presenting for the following health issues:  Hypertension (States since he started Lisinopril states he has been feeling much better, has not been checking BP regularly. )      HPI     42-year-old gentleman with hypertension comes in today to follow-up on his blood pressure.  He was started on lisinopril 10 mg.  He is tolerating the medication with a little bit of itchiness in his throat otherwise does not endorse any other side effects.  His blood pressure is better but still elevated.  He actually feels much more energetic and better now that his blood pressure is better and he is on medication.  He has been following a healthy lifestyle modification including exercise and diet and has lost almost 20 pounds.    We reviewed his most recent labs.  He has dyslipidemia, he has prediabetes, and elevated liver enzymes consistent with that of fatty acid.    He also has slightly low testosterone with a total testosterone of 215.  He endorses erectile dysfunction and has tried  "sildenafil         Objective    BP (!) 122/90   Pulse 65   Ht 1.791 m (5' 10.5\")   Wt 121.7 kg (268 lb 4 oz)   SpO2 99%   BMI 37.95 kg/m    Body mass index is 37.95 kg/m .  Physical Exam     Constitutional: Patient is oriented to person, place, and time. Patient appears well-developed and well-nourished. No distress.   Head: Normocephalic and atraumatic.   Right Ear: External ear normal.   Left Ear: External ear normal.   Eyes: Conjunctivae and EOM are normal. Right eye exhibits no discharge. Left eye exhibits no discharge. No scleral icterus.   Neurological: Patient is alert and oriented to person, place, and time.  Skin: No rash noted. Patient is not diaphoretic. No erythema. No pallor.     A total of 30 minutes visit with over 50% of the time spent on counseling the patient, coordinating care, time precharting, completing documentation of the visit, and working on the encounter.     "

## 2022-10-01 ENCOUNTER — HEALTH MAINTENANCE LETTER (OUTPATIENT)
Age: 42
End: 2022-10-01

## 2022-10-05 ENCOUNTER — MYC MEDICAL ADVICE (OUTPATIENT)
Dept: FAMILY MEDICINE | Facility: CLINIC | Age: 42
End: 2022-10-05

## 2022-10-05 NOTE — TELEPHONE ENCOUNTER
Sending to PCP for review.  Please review and advise on patient MyChart message.    Hang Brito RN  [facility]

## 2022-10-09 DIAGNOSIS — I10 PRIMARY HYPERTENSION: ICD-10-CM

## 2022-10-09 RX ORDER — LISINOPRIL 10 MG/1
10 TABLET ORAL DAILY
Qty: 30 TABLET | Refills: 0 | OUTPATIENT
Start: 2022-10-09

## 2022-10-09 NOTE — TELEPHONE ENCOUNTER
Outpatient Medication Detail     Disp Refills Start End BRUCE   lisinopril (ZESTRIL) 10 MG tablet (Discontinued) 30 tablet 0 9/8/2022 9/13/2022 No   Sig - Route: TAKE 1 TABLET (10 MG) BY MOUTH DAILY. - Oral   Sent to pharmacy as: Lisinopril 10 MG Oral Tablet (ZESTRIL)   Class: E-Prescribe

## 2022-11-23 ENCOUNTER — TRANSFERRED RECORDS (OUTPATIENT)
Dept: HEALTH INFORMATION MANAGEMENT | Facility: CLINIC | Age: 42
End: 2022-11-23

## 2022-11-30 ENCOUNTER — OFFICE VISIT (OUTPATIENT)
Dept: FAMILY MEDICINE | Facility: CLINIC | Age: 42
End: 2022-11-30
Payer: COMMERCIAL

## 2022-11-30 VITALS
WEIGHT: 258.38 LBS | DIASTOLIC BLOOD PRESSURE: 84 MMHG | SYSTOLIC BLOOD PRESSURE: 138 MMHG | BODY MASS INDEX: 36.55 KG/M2 | OXYGEN SATURATION: 99 % | HEART RATE: 65 BPM

## 2022-11-30 DIAGNOSIS — N52.9 ERECTILE DYSFUNCTION, UNSPECIFIED ERECTILE DYSFUNCTION TYPE: Primary | ICD-10-CM

## 2022-11-30 DIAGNOSIS — I10 PRIMARY HYPERTENSION: ICD-10-CM

## 2022-11-30 PROCEDURE — 99213 OFFICE O/P EST LOW 20 MIN: CPT | Performed by: FAMILY MEDICINE

## 2022-11-30 RX ORDER — BLOOD PRESSURE TEST KIT
KIT MISCELLANEOUS
Qty: 1 KIT | Refills: 0 | Status: SHIPPED | OUTPATIENT
Start: 2022-11-30

## 2022-11-30 RX ORDER — LISINOPRIL 20 MG/1
20 TABLET ORAL DAILY
Qty: 90 TABLET | Refills: 3 | Status: CANCELLED | OUTPATIENT
Start: 2022-11-30

## 2022-11-30 NOTE — PROGRESS NOTES
Assessment & Plan     Primary hypertension  Continue with lisinopril 20mg  Check BP at home and send numbers to me in 1-2 wks  Counseled healthy lifestyle modifications  - Blood Pressure KIT  Dispense: 1 kit; Refill: 0    Erectile dysfunction, unspecified erectile dysfunction type  Given that I will be departing from the clinic, I will refer him to discuss the possibility of testosterone therapy and other management of erectile dysfunction  - Adult Urology  Referral      Familia Mariano MD  [facility]    Suzanna Varghese is a 42 year old presenting for the following health issues:  Follow Up (BP check)      History of Present Illness       Hypertension: He presents for follow up of hypertension.  He does not check blood pressure  regularly outside of the clinic. Outpatient blood pressures have not been over 140/90. He follows a low salt diet.       Here for blood pressure check  Tolerating lisinopril 20mg; initially with dry cough but has gotten better. Takes the pill at nighttime  No chest pain. No SOB.        Objective    /84 (BP Location: Left arm, Patient Position: Sitting, Cuff Size: Adult Large)   Pulse 65   Wt 117.2 kg (258 lb 6 oz)   SpO2 99%   BMI 36.55 kg/m    Body mass index is 36.55 kg/m .  Physical Exam     Constitutional: Patient is oriented to person, place, and time. Patient appears well-developed and well-nourished. No distress.   Head: Normocephalic and atraumatic.   Right Ear: External ear normal.   Left Ear: External ear normal.   Eyes: Conjunctivae and EOM are normal. Right eye exhibits no discharge. Left eye exhibits no discharge. No scleral icterus.   Neurological: Patient is alert and oriented to person, place, and time.  Skin: No rash noted. Patient is not diaphoretic. No erythema. No pallor.

## 2022-12-18 DIAGNOSIS — I10 PRIMARY HYPERTENSION: ICD-10-CM

## 2022-12-18 RX ORDER — LISINOPRIL 20 MG/1
TABLET ORAL
Qty: 90 TABLET | Refills: 0 | Status: SHIPPED | OUTPATIENT
Start: 2022-12-18 | End: 2023-03-22

## 2022-12-19 NOTE — TELEPHONE ENCOUNTER
"Last Written Prescription Date:  9/13/2022  Last Fill Quantity: 90,  # refills: 0   Last office visit provider:  11/30/2022     Requested Prescriptions   Pending Prescriptions Disp Refills     lisinopril (ZESTRIL) 20 MG tablet [Pharmacy Med Name: LISINOPRIL 20 MG TABLET] 90 tablet 0     Sig: TAKE 1 TABLET BY MOUTH EVERY DAY       ACE Inhibitors (Including Combos) Protocol Passed - 12/18/2022  7:52 AM        Passed - Blood pressure under 140/90 in past 12 months     BP Readings from Last 3 Encounters:   11/30/22 138/84   09/13/22 (!) 122/90   08/11/22 (!) 160/110                 Passed - Recent (12 mo) or future (30 days) visit within the authorizing provider's specialty     Patient has had an office visit with the authorizing provider or a provider within the authorizing providers department within the previous 12 mos or has a future within next 30 days. See \"Patient Info\" tab in inbasket, or \"Choose Columns\" in Meds & Orders section of the refill encounter.              Passed - Medication is active on med list        Passed - Patient is age 18 or older        Passed - Normal serum creatinine on file in past 12 months     Recent Labs   Lab Test 08/11/22  0805   CR 1.01       Ok to refill medication if creatinine is low          Passed - Normal serum potassium on file in past 12 months     Recent Labs   Lab Test 08/11/22  0805   POTASSIUM 4.1                  Tiffany Bowling RN 12/18/22 7:39 PM  "

## 2023-03-20 DIAGNOSIS — I10 PRIMARY HYPERTENSION: ICD-10-CM

## 2023-03-20 NOTE — TELEPHONE ENCOUNTER
03/20/23  LM for pt to schedule an appt to establish care with an open provider: Gideon Nieves, Lynda Truong, Tiffany Beth, Kristi Park.  Jenny

## 2023-03-20 NOTE — TELEPHONE ENCOUNTER
"Former patient of Familia Garcia & has not established care with another provider.  Please assign refill request to covering provider per Clinic standard process.      Last Written Prescription Date:  12/18/22  Last Fill Quantity: 90,  # refills: 0   Last office visit provider:  11/30/22     Requested Prescriptions   Pending Prescriptions Disp Refills     lisinopril (ZESTRIL) 20 MG tablet [Pharmacy Med Name: LISINOPRIL 20 MG TABLET] 90 tablet 0     Sig: TAKE 1 TABLET BY MOUTH EVERY DAY       ACE Inhibitors (Including Combos) Protocol Passed - 3/20/2023 12:50 AM        Passed - Blood pressure under 140/90 in past 12 months     BP Readings from Last 3 Encounters:   11/30/22 138/84   09/13/22 (!) 122/90   08/11/22 (!) 160/110                 Passed - Recent (12 mo) or future (30 days) visit within the authorizing provider's specialty     Patient has had an office visit with the authorizing provider or a provider within the authorizing providers department within the previous 12 mos or has a future within next 30 days. See \"Patient Info\" tab in inbasket, or \"Choose Columns\" in Meds & Orders section of the refill encounter.              Passed - Medication is active on med list        Passed - Patient is age 18 or older        Passed - Normal serum creatinine on file in past 12 months     Recent Labs   Lab Test 08/11/22  0805   CR 1.01       Ok to refill medication if creatinine is low          Passed - Normal serum potassium on file in past 12 months     Recent Labs   Lab Test 08/11/22  0805   POTASSIUM 4.1                  Chasidy Dwyer RN 03/20/23 12:07 PM  "

## 2023-03-21 NOTE — TELEPHONE ENCOUNTER
03/21/23  LM for pt to schedule an appt to establish care with an open provider: Gideon Nieves, Lynda Truong, Tiffany Beth, Kristi Park.  And sent [employer] message  Jenny

## 2023-03-22 RX ORDER — LISINOPRIL 20 MG/1
TABLET ORAL
Qty: 90 TABLET | Refills: 0 | Status: SHIPPED | OUTPATIENT
Start: 2023-03-22

## 2023-10-15 ENCOUNTER — HEALTH MAINTENANCE LETTER (OUTPATIENT)
Age: 43
End: 2023-10-15

## 2024-12-07 ENCOUNTER — HEALTH MAINTENANCE LETTER (OUTPATIENT)
Age: 44
End: 2024-12-07